# Patient Record
Sex: FEMALE | Race: WHITE | NOT HISPANIC OR LATINO | Employment: FULL TIME | URBAN - METROPOLITAN AREA
[De-identification: names, ages, dates, MRNs, and addresses within clinical notes are randomized per-mention and may not be internally consistent; named-entity substitution may affect disease eponyms.]

---

## 2023-06-30 LAB
ERYTHROCYTE DISTRIBUTION WIDTH (RATIO) BY AUTOMATED COUNT: 13.3 % (ref 11.5–14.5)
ERYTHROCYTE MEAN CORPUSCULAR HEMOGLOBIN CONCENTRATION (G/DL) BY AUTOMATED: 32.9 G/DL (ref 32–36)
ERYTHROCYTE MEAN CORPUSCULAR VOLUME (FL) BY AUTOMATED COUNT: 89 FL (ref 80–100)
ERYTHROCYTES (10*6/UL) IN BLOOD BY AUTOMATED COUNT: 4.62 X10E12/L (ref 4–5.2)
HEMATOCRIT (%) IN BLOOD BY AUTOMATED COUNT: 41.3 % (ref 36–46)
HEMOGLOBIN (G/DL) IN BLOOD: 13.6 G/DL (ref 12–16)
LEUKOCYTES (10*3/UL) IN BLOOD BY AUTOMATED COUNT: 6.2 X10E9/L (ref 4.4–11.3)
PLATELETS (10*3/UL) IN BLOOD AUTOMATED COUNT: 328 X10E9/L (ref 150–450)
THYROTROPIN (MIU/L) IN SER/PLAS BY DETECTION LIMIT <= 0.05 MIU/L: 1.58 MIU/L (ref 0.44–3.98)
THYROXINE (T4) (UG/DL) IN SER/PLAS: 8.5 UG/DL (ref 4.5–11.1)
TRIIODOTHYRONINE (T3) (NG/DL) IN SER/PLAS: 104 NG/DL (ref 60–200)

## 2023-07-19 ENCOUNTER — HOSPITAL ENCOUNTER (OUTPATIENT)
Dept: DATA CONVERSION | Facility: HOSPITAL | Age: 68
End: 2023-07-19
Attending: RADIOLOGY
Payer: COMMERCIAL

## 2023-07-19 DIAGNOSIS — E04.1 NONTOXIC SINGLE THYROID NODULE: ICD-10-CM

## 2023-07-21 LAB
COMPLETE PATHOLOGY REPORT: NORMAL
CONVERTED CLINICAL DIAGNOSIS-HISTORY: NORMAL
CONVERTED DIAGNOSIS COMMENT: NORMAL
CONVERTED FINAL DIAGNOSIS: NORMAL
CONVERTED FINAL REPORT PDF LINK TO COPY AND PASTE: NORMAL
CONVERTED SPECIMEN DESCRIPTION: NORMAL

## 2024-01-10 ENCOUNTER — APPOINTMENT (OUTPATIENT)
Dept: PRIMARY CARE | Facility: CLINIC | Age: 69
End: 2024-01-10
Payer: COMMERCIAL

## 2024-01-15 ENCOUNTER — OFFICE VISIT (OUTPATIENT)
Dept: PRIMARY CARE | Facility: CLINIC | Age: 69
End: 2024-01-15
Payer: COMMERCIAL

## 2024-01-15 VITALS
DIASTOLIC BLOOD PRESSURE: 60 MMHG | HEIGHT: 63 IN | OXYGEN SATURATION: 96 % | TEMPERATURE: 98 F | WEIGHT: 141 LBS | SYSTOLIC BLOOD PRESSURE: 128 MMHG | BODY MASS INDEX: 24.98 KG/M2 | HEART RATE: 69 BPM

## 2024-01-15 DIAGNOSIS — M17.11 PRIMARY OSTEOARTHRITIS OF RIGHT KNEE: ICD-10-CM

## 2024-01-15 DIAGNOSIS — L84 CORN OF FOOT: Primary | ICD-10-CM

## 2024-01-15 PROCEDURE — 99214 OFFICE O/P EST MOD 30 MIN: CPT | Performed by: FAMILY MEDICINE

## 2024-01-15 PROCEDURE — 1159F MED LIST DOCD IN RCRD: CPT | Performed by: FAMILY MEDICINE

## 2024-01-15 PROCEDURE — 1036F TOBACCO NON-USER: CPT | Performed by: FAMILY MEDICINE

## 2024-01-15 RX ORDER — ALENDRONATE SODIUM 70 MG/1
TABLET ORAL
COMMUNITY
Start: 2018-10-04

## 2024-01-15 ASSESSMENT — ENCOUNTER SYMPTOMS
LOSS OF SENSATION IN FEET: 0
DEPRESSION: 0
OCCASIONAL FEELINGS OF UNSTEADINESS: 0

## 2024-01-15 NOTE — PROGRESS NOTES
"Subjective   Patient ID: Yelena Padilla is a 68 y.o. female who presents for Knee Pain.    HPI C/o intermittent achy pain right knee x 3 months. Denies any known injury.     Also c/o growth right big toe x 1 month.     Review of Systems   All other systems reviewed and are negative.      Objective   /60   Pulse 69   Temp 36.7 °C (98 °F)   Ht 1.588 m (5' 2.5\")   Wt 64 kg (141 lb)   SpO2 96%   BMI 25.38 kg/m²     Physical Exam  Constitutional:       Appearance: Normal appearance.   HENT:      Mouth/Throat:      Mouth: Mucous membranes are moist.   Eyes:      Conjunctiva/sclera: Conjunctivae normal.   Cardiovascular:      Rate and Rhythm: Normal rate and regular rhythm.   Pulmonary:      Effort: Pulmonary effort is normal.      Breath sounds: Normal breath sounds.   Abdominal:      Palpations: Abdomen is soft.   Musculoskeletal:         General: Normal range of motion.      Comments: Right knee was not red, hot or showing effusion. She has normal ROM. Gait was wnl.   Skin:     General: Skin is warm and dry.      Comments: Pt has a very small 1 mm superficial corn in the plantar surface of her right big toe. This was easily removed manually in the office.   Neurological:      Mental Status: She is alert and oriented to person, place, and time.   Psychiatric:         Mood and Affect: Mood normal.         Assessment/Plan Encouraged to use pumice stone and ceramide lotion for corn. 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 She is refusing Xray or PT. Demonstrated several at home knee exercises and referred to \"knees over toes elaine\" for online instruction on home strengthening exercises for the knee -- states she is willing to do that. Will RTC if the pain worsens. Also may use ice and OTC NSAID.          "

## 2024-04-30 ENCOUNTER — HOSPITAL ENCOUNTER (OUTPATIENT)
Dept: RADIOLOGY | Facility: EXTERNAL LOCATION | Age: 69
Discharge: HOME | End: 2024-04-30

## 2024-04-30 ENCOUNTER — OFFICE VISIT (OUTPATIENT)
Dept: PRIMARY CARE | Facility: CLINIC | Age: 69
End: 2024-04-30
Payer: COMMERCIAL

## 2024-04-30 VITALS
BODY MASS INDEX: 23.01 KG/M2 | SYSTOLIC BLOOD PRESSURE: 142 MMHG | HEIGHT: 64 IN | DIASTOLIC BLOOD PRESSURE: 62 MMHG | WEIGHT: 134.8 LBS | HEART RATE: 87 BPM | OXYGEN SATURATION: 98 %

## 2024-04-30 DIAGNOSIS — R07.81 RIB PAIN ON LEFT SIDE: Primary | ICD-10-CM

## 2024-04-30 DIAGNOSIS — R07.81 RIB PAIN ON LEFT SIDE: ICD-10-CM

## 2024-04-30 PROCEDURE — 1159F MED LIST DOCD IN RCRD: CPT | Performed by: PHYSICIAN ASSISTANT

## 2024-04-30 PROCEDURE — 99214 OFFICE O/P EST MOD 30 MIN: CPT | Performed by: PHYSICIAN ASSISTANT

## 2024-04-30 ASSESSMENT — PATIENT HEALTH QUESTIONNAIRE - PHQ9
SUM OF ALL RESPONSES TO PHQ9 QUESTIONS 1 AND 2: 0
1. LITTLE INTEREST OR PLEASURE IN DOING THINGS: NOT AT ALL
2. FEELING DOWN, DEPRESSED OR HOPELESS: NOT AT ALL

## 2024-04-30 NOTE — PROGRESS NOTES
"Subjective   Patient ID: Yelena Padilla is a 69 y.o. female who presents for left side rib pain.    HPI     Two days ago, she was reaching down off of her mower and felt and heard a \"crack\" in left side when she was leaning far against the tractor. She had immediate pain when it happened - was in a small area where she felt the crack. Did feel slight pain with walking. Yesterday, she did have a little pain but 'nothing that she couldn't handle.' This morning noticed that it was sharper/worse. Her bra pressing on the area was even causing pain. Twisting to the right, lifting with her arms, and also leaning back causes increased pain. She has not felt any pop/crack since then. Pain is located under the left breast. She is having pain currently just sitting, rated 6.5/10 and is 9/10 with flaring up with movement - \"takes her breath away.\" There is some pain with deep breathing, but not with coughing/sneezing.     Review of Systems   All other systems reviewed and are negative.      Objective   /62 (BP Location: Right arm, Patient Position: Sitting, BP Cuff Size: Adult)   Pulse 87   Ht 1.626 m (5' 4\")   Wt 61.1 kg (134 lb 12.8 oz)   SpO2 98%   BMI 23.14 kg/m²     Physical Exam  Vitals reviewed.   Constitutional:       General: She is awake.      Appearance: Normal appearance. She is well-developed.   HENT:      Head: Normocephalic and atraumatic.   Cardiovascular:      Rate and Rhythm: Normal rate.   Pulmonary:      Effort: Pulmonary effort is normal.   Chest:      Chest wall: Tenderness present.          Comments: TTP over the circled area, and also there is a firm \"lump\" that does not appear consistent on the right side, right in the pt's area of pain and over the rib bone directly. No obvious deformity or defect noted. No bruising.  Musculoskeletal:      Cervical back: Full passive range of motion without pain.      Right lower leg: No edema.      Left lower leg: No edema.   Skin:     General: Skin is warm " and dry.      Findings: No lesion or rash.   Neurological:      General: No focal deficit present.      Mental Status: She is alert and oriented to person, place, and time.      Cranial Nerves: No facial asymmetry.      Motor: Motor function is intact.      Gait: Gait is intact.   Psychiatric:         Attention and Perception: Attention normal.         Mood and Affect: Mood and affect normal.         Assessment/Plan   Diagnoses and all orders for this visit:  Rib pain on left side  -     XR ribs 2 views left w chest pa or ap; Future    Pt declines anything to use for pain control at this time. Would just like imaging order for XR to check for broken rib. Do not suspect that there is a punctured lung, but cannot definitively rule out broken rib with some displacement without imaging. Discussed with pt that without displacement, sometimes rib fractures that are somewhat new can be difficult to see on XR and may require repeat imaging; however, treatment is conservative for these fractures, and if pt's pain is improving with rest and OTC medications as needed, she may not necessarily need the follow up imaging. Pt verbalized understanding of this.

## 2024-05-01 ENCOUNTER — TELEPHONE (OUTPATIENT)
Dept: PRIMARY CARE | Facility: CLINIC | Age: 69
End: 2024-05-01
Payer: COMMERCIAL

## 2024-05-07 ENCOUNTER — OFFICE VISIT (OUTPATIENT)
Dept: PRIMARY CARE | Facility: CLINIC | Age: 69
End: 2024-05-07
Payer: COMMERCIAL

## 2024-05-07 VITALS
WEIGHT: 136 LBS | SYSTOLIC BLOOD PRESSURE: 118 MMHG | HEART RATE: 75 BPM | DIASTOLIC BLOOD PRESSURE: 64 MMHG | TEMPERATURE: 97.8 F | HEIGHT: 64 IN | OXYGEN SATURATION: 99 % | BODY MASS INDEX: 23.22 KG/M2

## 2024-05-07 DIAGNOSIS — R13.10 DYSPHAGIA, UNSPECIFIED TYPE: ICD-10-CM

## 2024-05-07 DIAGNOSIS — E04.1 THYROID CYST: Primary | ICD-10-CM

## 2024-05-07 PROCEDURE — 1159F MED LIST DOCD IN RCRD: CPT | Performed by: FAMILY MEDICINE

## 2024-05-07 PROCEDURE — 99214 OFFICE O/P EST MOD 30 MIN: CPT | Performed by: FAMILY MEDICINE

## 2024-05-07 NOTE — PROGRESS NOTES
"Subjective   Patient ID: Yelena Padilla is a 68 y.o. female who presents for Follow-up (Thyroid cyst ).    HPI Patient had Thyroid US done 7/2023. States its sometimes hard to swallow. Patient would like to discuss referral to have cyst removed.     Review of Systems   All other systems reviewed and are negative.      Objective   /64   Pulse 75   Temp 36.6 °C (97.8 °F)   Ht 1.626 m (5' 4\")   Wt 61.7 kg (136 lb)   SpO2 99%   BMI 23.34 kg/m²     Physical Exam  Constitutional:       Appearance: Normal appearance.   HENT:      Mouth/Throat:      Mouth: Mucous membranes are moist.   Eyes:      Conjunctiva/sclera: Conjunctivae normal.   Cardiovascular:      Rate and Rhythm: Normal rate and regular rhythm.   Pulmonary:      Effort: Pulmonary effort is normal.      Breath sounds: Normal breath sounds.   Abdominal:      Palpations: Abdomen is soft.   Musculoskeletal:         General: Normal range of motion.   Skin:     General: Skin is warm and dry.   Neurological:      Mental Status: She is alert and oriented to person, place, and time.   Psychiatric:         Mood and Affect: Mood normal.         Assessment/Plan   Diagnoses and all orders for this visit:  Thyroid cyst  -     Referral to ENT; Future  Dysphagia, unspecified type  -     Referral to ENT; Future       "

## 2024-07-11 PROBLEM — E07.9 MASS OF THYROID GLAND: Status: ACTIVE | Noted: 2023-07-03

## 2024-07-11 PROBLEM — K59.09 OTHER CONSTIPATION: Status: ACTIVE | Noted: 2024-07-11

## 2024-07-11 PROBLEM — R60.9 EDEMA: Status: ACTIVE | Noted: 2024-07-11

## 2024-07-11 PROBLEM — M17.11 OSTEOARTHRITIS OF RIGHT KNEE: Status: ACTIVE | Noted: 2024-07-11

## 2024-07-11 PROBLEM — N32.81 OAB (OVERACTIVE BLADDER): Status: ACTIVE | Noted: 2017-12-08

## 2024-07-11 PROBLEM — H81.11 BENIGN PAROXYSMAL POSITIONAL VERTIGO OF RIGHT EAR: Status: ACTIVE | Noted: 2024-07-11

## 2024-07-11 PROBLEM — E78.5 DYSLIPIDEMIA: Status: ACTIVE | Noted: 2024-07-11

## 2024-07-11 PROBLEM — R05.9 COUGH: Status: ACTIVE | Noted: 2024-07-11

## 2024-07-11 PROBLEM — S30.861A TICK BITE OF ABDOMINAL WALL: Status: ACTIVE | Noted: 2024-07-11

## 2024-07-11 PROBLEM — R10.2 PELVIC PAIN IN FEMALE: Status: ACTIVE | Noted: 2024-07-11

## 2024-07-11 PROBLEM — R07.89 PAIN OF STERNUM: Status: ACTIVE | Noted: 2024-07-11

## 2024-07-11 PROBLEM — T14.8XXA MUSCLE STRAIN: Status: ACTIVE | Noted: 2024-07-11

## 2024-07-11 PROBLEM — H60.90 OTITIS EXTERNA: Status: ACTIVE | Noted: 2024-07-11

## 2024-07-11 PROBLEM — Z20.822 CONTACT WITH AND (SUSPECTED) EXPOSURE TO COVID-19: Status: ACTIVE | Noted: 2022-11-10

## 2024-07-11 PROBLEM — R19.5 POSITIVE COLORECTAL CANCER SCREENING USING COLOGUARD TEST: Status: ACTIVE | Noted: 2024-07-11

## 2024-07-11 PROBLEM — L84 CORN: Status: ACTIVE | Noted: 2024-07-11

## 2024-07-11 PROBLEM — R10.9 ABDOMINAL PAIN: Status: ACTIVE | Noted: 2024-07-11

## 2024-07-11 PROBLEM — R25.1 TREMOR: Status: ACTIVE | Noted: 2024-07-11

## 2024-07-11 PROBLEM — I87.2 VENOUS INSUFFICIENCY: Status: ACTIVE | Noted: 2024-07-11

## 2024-07-11 PROBLEM — R41.3 AMNESIA MEMORY LOSS: Status: ACTIVE | Noted: 2024-07-11
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
## 2024-07-11 PROBLEM — R60.0 BILATERAL LOWER EXTREMITY EDEMA: Status: ACTIVE | Noted: 2024-07-11

## 2024-07-24 ENCOUNTER — APPOINTMENT (OUTPATIENT)
Dept: OTOLARYNGOLOGY | Facility: CLINIC | Age: 69
End: 2024-07-24
Payer: COMMERCIAL

## 2024-07-24 VITALS — HEIGHT: 64 IN | WEIGHT: 132 LBS | BODY MASS INDEX: 22.53 KG/M2

## 2024-07-24 DIAGNOSIS — E04.1 THYROID CYST: Primary | ICD-10-CM

## 2024-07-24 DIAGNOSIS — E04.1 THYROID NODULE: ICD-10-CM

## 2024-07-24 DIAGNOSIS — R13.10 DYSPHAGIA, UNSPECIFIED TYPE: ICD-10-CM

## 2024-07-24 PROCEDURE — 1159F MED LIST DOCD IN RCRD: CPT | Performed by: STUDENT IN AN ORGANIZED HEALTH CARE EDUCATION/TRAINING PROGRAM

## 2024-07-24 PROCEDURE — 99204 OFFICE O/P NEW MOD 45 MIN: CPT | Performed by: STUDENT IN AN ORGANIZED HEALTH CARE EDUCATION/TRAINING PROGRAM

## 2024-07-24 PROCEDURE — 3008F BODY MASS INDEX DOCD: CPT | Performed by: STUDENT IN AN ORGANIZED HEALTH CARE EDUCATION/TRAINING PROGRAM

## 2024-07-24 PROCEDURE — 1036F TOBACCO NON-USER: CPT | Performed by: STUDENT IN AN ORGANIZED HEALTH CARE EDUCATION/TRAINING PROGRAM

## 2024-07-24 PROCEDURE — 31575 DIAGNOSTIC LARYNGOSCOPY: CPT | Performed by: STUDENT IN AN ORGANIZED HEALTH CARE EDUCATION/TRAINING PROGRAM

## 2024-07-24 NOTE — PROGRESS NOTES
BERENICE Padilla is a 69 y.o. female presenting for evaluation of thyroid nodules/ cyst.  The patient underwent a thyroid US 7/4/24 showing a right thyroid nodule measuring up to 10.5 mm TIRADS 5. In addition to prominent cyst along the right thyroid lobe was observed measuring 27mm.  Subsequent FNA was consistent with a benign follicular nodule. TSH 6/3/23 1.58.  Currently the patient endorses occasional pressure along the right thyroid / right neck region more noticeable when swallowing large food bolus, she is interested in exploring surgical options. Denies odynophagia, fevers, chills, night sweats, neck lumps or bumps, neck pain, weight loss, hyper or hypothyroid symptomatology.  Mother with hx of hypothyroidism   No radiation exposure       History reviewed. No pertinent past medical history.    Past Surgical History:   Procedure Laterality Date    OTHER SURGICAL HISTORY  09/27/2022    Tonsillectomy    OTHER SURGICAL HISTORY  09/27/2022    Wrist surgery    US GUIDED THYROID BIOPSY  7/19/2023    US GUIDED THYROID BIOPSY 7/19/2023 POR US         Current Outpatient Medications on File Prior to Visit   Medication Sig Dispense Refill    alendronate (Fosamax) 70 mg tablet TAKE 1 TABLET ONCE A WEEK Orally q week for 84 days       No current facility-administered medications on file prior to visit.        Allergies   Allergen Reactions    Amoxicillin Hives and Unknown    Clarithromycin Hives, Itching, Other, Rash, GI Upset and Unknown    Codeine Other        Review of Systems  A detailed 12 point ROS was performed and is negative except as noted in the intake form, HPI and/or Past Medical History        Physical Exam   CONSTITUTIONAL: Well developed, well nourished.  VOICE: Normal voice quality  RESPIRATION: Breathing comfortably, no stridor.  CV: No clubbing/cyanosis/edema in hands.  EYES: EOM Intact, sclera normal.  NEURO: Alert and oriented times 3, Cranial nerves V,VII intact and symmetric bilaterally.  HEAD AND  FACE: Symmetric facial features, no masses or lesions, sinuses nontender to palpation.  SALIVARY GLANDS: Parotid and submandibular glands normal bilaterally.  EARS: Normal external ears, external auditory canals, and TMs to otoscopy  NOSE: External nose midline, anterior rhinoscopy is normal with limited visualization to the anterior aspect of the interior turbinates. No lesions noted.  ORAL CAVITY/OROPHARYNX/LIPS: Normal mucous membranes, normal floor of mouth/tongue/OP, no masses or lesions are noted.  PHARYNGEAL WALLS AND NASOPHARYNX: No masses noted. Mucosa appears clean and moist  + NECK/LYMPH: No LAD, cyst noted along the right thyroid. Trachea palpably midline  SKIN: Neck skin is without injury  PSYCH: Alert and oriented with appropriate mood and affect     PROCEDURE NOTE:  FLEXIBLE NASOLARYNGOSCOPY     The risks, benefits, and alternatives were explained.  The patient wished to proceed and provided verbal consent.       INDICATIONS: To visualize anatomy in specific detail; evaluation of symptomatic disorder involving the voice, swallowing, or upper aerodigestive tract, including CHENG.      DESCRIPTION OF PROCEDURE: After adequate afrin and lidocaine spray, I advanced the endoscope into the nasal cavity.  The nasal cavity, nasopharynx, tongue base, vallecula, posterior/lateral pharyngeal walls, pyriform, epiglottis, post cricoid area, and larynx were within normal limits.  The following specific findings should be noted:  No lesions/masses  Mobile TVCs bilaterally  No pooling of secretions     The patient tolerated the procedure without difficulty.     Results:  Thyroid US 7/4/23  RIGHT LOBE:  The right thyroid lobe measures 4.6 x 3.3 x 2.9 cm. The right lobe of  the thyroid is heterogenous in echotexture and demonstrates nodules  described below.     A unilocular thin walled cyst in the right thyroid lobe measuring up  to 27 mm on image 18 containing internal debris likely representing a  colloid cyst.      Within the mid right thyroid lobe there is a 10.5 x 10 x 9 mm, solid  or almost completely solid (2 pts), hypoechoic (2 pts),  taller-than-wide (3 pts) nodules with ill-defined (0 pts) margins and  contains no echogenic foci or large comet-tail artifacts (0 pts).  (Image 21)     The total score of this nodule is 7 corresponding to a TI-RADS  category  5; (>7 points) Highly suspicious. FNA is recommended if  >1.0cm, Follow up if >0.5cm every year for 5 years.     LEFT LOBE:  The left lobe of the thyroid measures 3.2 x 1.3 x 1.6 cm. The left  lobe of the thyroid is heterogenous in echotexture and demonstrates  nodules described below.  A 7 mm upper pole left thyroid lobe nodule on image 40.  A 8 mm spongiform nodule in the mid left thyroid lobe on image 43.  A cystic nodule in the lower left thyroid lobe likely representing  colloid cyst on image 46.     ISTHMUS:  The isthmus measures approximately 1 mm and is homogeneous in  echotexture and without any identifiable nodules.     CERVICAL LYMPH NODES:  No cervical lymphadenopathy is present.      Impression   1. Nodule in the  right thyroid gland measuring up to 10.5 mm, a  TIRADS category 5 nodule. Based on the size criteria  fine-needle  aspiration is recommended.  2. Prominent colloid cyst in the right thyroid lobe.  3. Subcentimeter Spongiform nodules and colloid cyst in the left  thyroid lobe for which no additional follow-up is suggested.       Assessment  Right thyroid nodules/ cyst    Plan  69-year-old female presenting for evaluation of thyroid cyst/nodules.  Ultrasound notable for Right thyroid TIRADS category 5 nodule, FNA pathology consistent with benign follicular nodule.  In addition a right colloid cyst was noted.  The patient endorses occasional compressive symptoms and is interested in exploring surgical options vs aspiration.  She was referred to [facility] for further evaluation.

## 2024-08-02 ENCOUNTER — TELEPHONE (OUTPATIENT)
Dept: PRIMARY CARE | Facility: CLINIC | Age: 69
End: 2024-08-02
Payer: COMMERCIAL

## 2024-08-02 NOTE — TELEPHONE ENCOUNTER
Attempted to contact patient to inform mammogram screening due provide order and contact to schedule. Unable to leave voicemail at this time phone rang and gave busy signal.

## 2024-08-15 ENCOUNTER — OFFICE VISIT (OUTPATIENT)
Dept: PRIMARY CARE | Facility: CLINIC | Age: 69
End: 2024-08-15
Payer: COMMERCIAL

## 2024-08-15 VITALS
OXYGEN SATURATION: 97 % | BODY MASS INDEX: 22.94 KG/M2 | HEIGHT: 64 IN | DIASTOLIC BLOOD PRESSURE: 60 MMHG | TEMPERATURE: 97.9 F | SYSTOLIC BLOOD PRESSURE: 118 MMHG | HEART RATE: 65 BPM | WEIGHT: 134.4 LBS

## 2024-08-15 DIAGNOSIS — K59.09 OTHER CONSTIPATION: Primary | ICD-10-CM

## 2024-08-15 PROCEDURE — 1124F ACP DISCUSS-NO DSCNMKR DOCD: CPT | Performed by: FAMILY MEDICINE

## 2024-08-15 PROCEDURE — 3008F BODY MASS INDEX DOCD: CPT | Performed by: FAMILY MEDICINE

## 2024-08-15 PROCEDURE — 1036F TOBACCO NON-USER: CPT | Performed by: FAMILY MEDICINE

## 2024-08-15 PROCEDURE — 1159F MED LIST DOCD IN RCRD: CPT | Performed by: FAMILY MEDICINE

## 2024-08-15 PROCEDURE — 99213 OFFICE O/P EST LOW 20 MIN: CPT | Performed by: FAMILY MEDICINE

## 2024-08-15 ASSESSMENT — PATIENT HEALTH QUESTIONNAIRE - PHQ9
1. LITTLE INTEREST OR PLEASURE IN DOING THINGS: NOT AT ALL
SUM OF ALL RESPONSES TO PHQ9 QUESTIONS 1 AND 2: 0
2. FEELING DOWN, DEPRESSED OR HOPELESS: NOT AT ALL

## 2024-08-15 NOTE — PROGRESS NOTES
"Subjective   Patient ID: Yelena Padilla is a 69 y.o. female who presents for Abdominal Pain.    HPI States over the past week she's had two episodes of clamping sensations across her lower abdomen. States the first one lasted for about 30 minutes and second one lasted a few minutes. Denies diarrhea, denies constipation. States she did have two deep green stools. Denies blood in stool. Denies any change in diet. Patient did have Colonoscopy August of 2022.    Review of Systems   All other systems reviewed and are negative.      Objective   /60   Pulse 65   Temp 36.6 °C (97.9 °F)   Ht 1.626 m (5' 4\")   Wt 61 kg (134 lb 6.4 oz)   SpO2 97%   BMI 23.07 kg/m²     Physical Exam  Constitutional:       Appearance: Normal appearance.   HENT:      Mouth/Throat:      Mouth: Mucous membranes are moist.   Eyes:      Conjunctiva/sclera: Conjunctivae normal.   Cardiovascular:      Rate and Rhythm: Normal rate and regular rhythm.   Pulmonary:      Effort: Pulmonary effort is normal.      Breath sounds: Normal breath sounds.   Abdominal:      Palpations: Abdomen is soft.   Musculoskeletal:         General: Normal range of motion.   Skin:     General: Skin is warm and dry.   Neurological:      Mental Status: She is alert and oriented to person, place, and time.   Psychiatric:         Mood and Affect: Mood normal.         Assessment/Plan   Diagnoses and all orders for this visit:  Other constipation        - This is not a daily issue. Only has a few episodes here and there. No mass protruding in the abdomen. No associated nausea/emesis, no blood in the stool        - Increase hydration and fiber        - RTC with any sign of blood in the stool, regular pain, protruding mass of the abdomen     "

## 2024-08-21 ENCOUNTER — TELEPHONE (OUTPATIENT)
Dept: PRIMARY CARE | Facility: CLINIC | Age: 69
End: 2024-08-21
Payer: COMMERCIAL

## 2024-08-21 DIAGNOSIS — K59.09 OTHER CONSTIPATION: ICD-10-CM

## 2024-08-21 NOTE — TELEPHONE ENCOUNTER
Patient called and left voicemail message stating she doesn't that she would like to have adominal imaging done.   I spoke with Dr. Simms, order placed for Abdominal films. Patient can go into [facility] to have them done.   I tried contacting patients phone number with busy constant busy signal. I also left message on work phone.

## 2024-08-30 ENCOUNTER — HOSPITAL ENCOUNTER (OUTPATIENT)
Dept: RADIOLOGY | Facility: CLINIC | Age: 69
Discharge: HOME | End: 2024-08-30
Payer: COMMERCIAL

## 2024-08-30 DIAGNOSIS — K59.09 OTHER CONSTIPATION: ICD-10-CM

## 2024-08-30 PROCEDURE — 74019 RADEX ABDOMEN 2 VIEWS: CPT

## 2024-10-16 ENCOUNTER — APPOINTMENT (OUTPATIENT)
Dept: OTOLARYNGOLOGY | Facility: CLINIC | Age: 69
End: 2024-10-16
Payer: COMMERCIAL

## 2024-11-25 ENCOUNTER — APPOINTMENT (OUTPATIENT)
Dept: OTOLARYNGOLOGY | Facility: CLINIC | Age: 69
End: 2024-11-25
Payer: COMMERCIAL

## 2024-11-25 VITALS — BODY MASS INDEX: 24.21 KG/M2 | HEIGHT: 64 IN | WEIGHT: 141.8 LBS

## 2024-11-25 DIAGNOSIS — E04.1 THYROID NODULE: ICD-10-CM

## 2024-11-25 PROCEDURE — 1036F TOBACCO NON-USER: CPT | Performed by: OTOLARYNGOLOGY

## 2024-11-25 PROCEDURE — 1159F MED LIST DOCD IN RCRD: CPT | Performed by: OTOLARYNGOLOGY

## 2024-11-25 PROCEDURE — 1160F RVW MEDS BY RX/DR IN RCRD: CPT | Performed by: OTOLARYNGOLOGY

## 2024-11-25 PROCEDURE — 3008F BODY MASS INDEX DOCD: CPT | Performed by: OTOLARYNGOLOGY

## 2024-11-25 PROCEDURE — 99203 OFFICE O/P NEW LOW 30 MIN: CPT | Performed by: OTOLARYNGOLOGY

## 2024-11-25 ASSESSMENT — ENCOUNTER SYMPTOMS
NAUSEA: 0
VOICE CHANGE: 0
FACIAL SWELLING: 0
ADENOPATHY: 0
SHORTNESS OF BREATH: 0
FEVER: 0
FATIGUE: 0
NECK PAIN: 0
VOMITING: 0
SORE THROAT: 0
STRIDOR: 0
CHILLS: 0
UNEXPECTED WEIGHT CHANGE: 0
COUGH: 0
APPETITE CHANGE: 0
TROUBLE SWALLOWING: 0

## 2024-11-25 NOTE — ASSESSMENT & PLAN NOTE
Bilateral thyroid nodules  Right thyroid nodule 1cm TR5 - s/p FNA benign nodule  Right thyroid cyst - s/p drainage   Left subcentimeter nodules - benign appearing  Follow up with repeat thyroid US - ordered today, will call with results

## 2024-11-25 NOTE — PATIENT INSTRUCTIONS
Dear Yelena SILVA Weekly    Welcome to Dr[facility],    Dr. Dalton is a Head and neck oncologist and reconstructive surgeon. This means that she specializes in caring for patients with complex head and neck problems such as cancer, however,you may be seeing her for another reason.      Dr. Dalton's office number is [phone] option #2.  While you may see her at a satellite office, she has a team committed to help meet your healthcare needs at [facility].  This number listed, is the most direct way to communicate with her office.      Dr. Dalton's  answers the office phone from 8am-4pm Monday-Friday.  She can help you with many general questions and information.  Questions that she may not be able to answer will be directed to the appropriate staff.  You will need to leave a message and someone from the team will call you back.     Sherry and Sparkle are Dr. Dalton's primary nurses. They can both be reached by calling the office as well. Sherry is in clinic on Mondays and Wednesdays with Dr. Dalton. Sherry is out of the office on Tuesday's and Friday's, but Sparkle is in the office on Tuesdays and Fridays and will be covering all patient concerns. Please be mindful that all non urgent calls will be returned within 24 hours of the call.     Sometimes, other team members will also be involved in your care.  These people may include dieticians, social workers, speech therapists, medical oncologists, or radiation oncologists.  Dr. Dalton will provide these referrals for you as needed.      For your connivence, Dr. Dalton sees patients at several [facility] locations. These locations are [facility], [facility], and [facility] the [facility].  While we try to make your appointment as convenient as possible, occasionally a visit to another location may be necessary to provide you with the best care.      We look  forward to working with you to meet your healthcare goals.    Dr. Dalton evaluated you today.    Your care plan is outlined below:  -- Thyroid US recommended.   -- We will call you with the thyroid US results.     General appointment line please call [phone]  For general questions or scheduling issues please call [phone] option #2   For medical questions or surgery scheduling please call [phone] on Mondays, Wednesdays and Thursdays or [phone] on Tuesdays and Fridays. Please be sure to leave a voice mail or your call will not be able to be returned.     Dr. Dalton makes every effort to run on time for your appointments.  Therefore, if you are more than 30 minutes late for your appointment, unrelated to a scan or another appointment such as chemotherapy or radiation, your appointment will need to be rescheduled to another day.  We appreciate your understanding.

## 2024-11-25 NOTE — PROGRESS NOTES
Chief Complaint   Patient presents with    thyroid cyst      HPI  Yelena Padilla is a 69 y.o. female referred to me today by Matthew Londono MD for further evaluation and treatment of thyroid nodules. Patient seen by Dr. Neal for thyroid nodules this summer. She had a thyroid US 7/3/23 which I personally reviewed. The right thyroid lobe measures 4.6 x 3.3 x 2.9 cm. Thin walled cyst in the right thyroid lobe measuring 2.7cm. In the mid right thyroid lobe there is a 1.05 x 1.0 x 0.9 cm. TIRADS 5. The left lobe of the thyroid measures 3.2 x 1.3 x 1.6 cm.  She had small subcentimeter left nodules which were all benign appearing, none of which met criteria for FNA.  Patient had an FNA of her R thyroid nodule TR5, and right thyroid cyst.  I personally reviewed her thyroid US and FNA results.  FNA of thyroid nodule was benign and cyst fluid was fluid.  She has done well.  Feels like there is more room in her throat since cyst was drained.  No imaging in the last year.  She is here for further evaluation.  No personal history of radiation.  No FH of thyroid cancer (mom with hypothyroidism).  No voice changes.  Denies odynophagia, dysphagia or otalgia. Tolerating a regular diet.  Denies weight loss.  No fevers/chills.  No night sweats.     Social History  She reports that she has never smoked. She has never used smokeless tobacco. She reports current alcohol use. She reports current drug use. Drug: Marijuana.    PMH  She has no past medical history on file.     PSH  She has a past surgical history that includes Other surgical history (09/27/2022); Other surgical history (09/27/2022); and US guided thyroid biopsy (7/19/2023).    ROS  Review of Systems   Constitutional:  Negative for appetite change, chills, fatigue, fever and unexpected weight change.   HENT:  Negative for dental problem, drooling, ear pain, facial swelling, hearing loss, mouth sores, sore throat, tinnitus, trouble swallowing and voice change.     Respiratory:  Negative for cough, shortness of breath and stridor.    Gastrointestinal:  Negative for nausea and vomiting.   Musculoskeletal:  Negative for neck pain.   Hematological:  Negative for adenopathy.        PE  ENT Physical Exam  Constitutional  Appearance: patient appears well-developed, thin,  Communication/Voice: communication appropriate for developmental age;  Head and Face  Appearance: head appears normal and face appears normal;  Salivary: glands normal;  Ear  Auricles: right auricle normal; left auricle normal;  Ear Canals: right ear canal normal; left ear canal normal;  Tympanic Membranes: right tympanic membrane normal; left tympanic membrane normal;  Nose  Internal Nose: nasal mucosa normal; septum normal;  Oral Cavity/Oropharynx  Gums: gingiva normal;  Tongue: normal;  Oral mucosa: normal;  Neck  Neck: neck normal; normal trachea;  Thyroid: Thyroid comments: No palpable nodules bilaterally - good elevation with swallowing  Neck comments: No lymphadenopathy  Respiratory  Inspection: breathing unlabored;  Cardiovascular  Inspection: extremities are warm and well perfused;  Neurovestibular  Mental Status: alert and oriented;  Psychiatric: mood normal; affect is appropriate;  Cranial Nerves: cranial nerves intact;       Procedures     ASSESSMENT AND PLAN  Problem List Items Addressed This Visit       Thyroid nodule    Current Assessment & Plan     Bilateral thyroid nodules  Right thyroid nodule 1cm TR5 - s/p FNA benign nodule  Right thyroid cyst - s/p drainage   Left subcentimeter nodules - benign appearing  Follow up with repeat thyroid US - ordered today, will call with results         Relevant Orders    US thyroid        Nyasia Dalton MD    Head & Neck Surgical Oncology & Reconstruction  Department of Otolaryngology - Head and Neck Surgery     By signing my name below, ISherry Scribe, attest that this documentation has been prepared under the direction and in the presence of  Dr. Nyasia Dalton MD.     All medical record entries made by the Scribe were at my direction and personally dictated by me, Dr. Nyasia Dalton. I have reviewed the chart and agree that the record accurately reflects my personal performance of the history, physical exam, discussion and plan.

## 2024-12-10 ENCOUNTER — OFFICE VISIT (OUTPATIENT)
Dept: PRIMARY CARE | Facility: CLINIC | Age: 69
End: 2024-12-10
Payer: MEDICARE

## 2024-12-10 VITALS
BODY MASS INDEX: 24.07 KG/M2 | HEIGHT: 64 IN | TEMPERATURE: 98.1 F | DIASTOLIC BLOOD PRESSURE: 60 MMHG | OXYGEN SATURATION: 96 % | WEIGHT: 141 LBS | HEART RATE: 70 BPM | SYSTOLIC BLOOD PRESSURE: 112 MMHG

## 2024-12-10 DIAGNOSIS — S00.31XA ABRASION OF NOSE, INITIAL ENCOUNTER: ICD-10-CM

## 2024-12-10 DIAGNOSIS — H10.501 BLEPHAROCONJUNCTIVITIS OF RIGHT EYE, UNSPECIFIED BLEPHAROCONJUNCTIVITIS TYPE: Primary | ICD-10-CM

## 2024-12-10 PROCEDURE — 1036F TOBACCO NON-USER: CPT | Performed by: FAMILY MEDICINE

## 2024-12-10 PROCEDURE — 99213 OFFICE O/P EST LOW 20 MIN: CPT | Performed by: FAMILY MEDICINE

## 2024-12-10 PROCEDURE — 1159F MED LIST DOCD IN RCRD: CPT | Performed by: FAMILY MEDICINE

## 2024-12-10 PROCEDURE — 3008F BODY MASS INDEX DOCD: CPT | Performed by: FAMILY MEDICINE

## 2024-12-10 PROCEDURE — 1124F ACP DISCUSS-NO DSCNMKR DOCD: CPT | Performed by: FAMILY MEDICINE

## 2024-12-10 RX ORDER — MUPIROCIN CALCIUM 20 MG/G
CREAM TOPICAL 3 TIMES DAILY
Qty: 30 G | Refills: 0 | Status: SHIPPED | OUTPATIENT
Start: 2024-12-10 | End: 2024-12-24

## 2024-12-10 RX ORDER — TOBRAMYCIN 3 MG/ML
2 SOLUTION/ DROPS OPHTHALMIC EVERY 4 HOURS
Qty: 5 ML | Refills: 0 | Status: SHIPPED | OUTPATIENT
Start: 2024-12-10 | End: 2024-12-15

## 2024-12-10 ASSESSMENT — PATIENT HEALTH QUESTIONNAIRE - PHQ9
1. LITTLE INTEREST OR PLEASURE IN DOING THINGS: NOT AT ALL
2. FEELING DOWN, DEPRESSED OR HOPELESS: NOT AT ALL
SUM OF ALL RESPONSES TO PHQ9 QUESTIONS 1 AND 2: 0

## 2024-12-10 NOTE — PROGRESS NOTES
"Subjective   Patient ID: Yelena Padilla is a 69 y.o. female who presents for Eye Pain.    HPI States her dog scratched her right eye this morning, c/o pain and redness. Patient did rinse her with cold water.    Review of Systems   All other systems reviewed and are negative.      Objective   /60   Pulse 70   Temp 36.7 °C (98.1 °F)   Ht 1.626 m (5' 4\")   Wt 64 kg (141 lb)   SpO2 96%   BMI 24.20 kg/m²     Physical Exam  Constitutional:       Appearance: Normal appearance.   HENT:      Mouth/Throat:      Mouth: Mucous membranes are moist.   Eyes:      Conjunctiva/sclera: Conjunctivae normal.      Comments: Pt has a small scratch wound in the medial canthus of the right eye. She additionally has an abrasion on the right aspect of the nasal bridge consistent with dog paw scratch she is complaining of. No discharge. Minimal conjunctivitis of the medial aspect of the right eye. No crusting. Lids close normally.   Cardiovascular:      Rate and Rhythm: Normal rate and regular rhythm.   Pulmonary:      Effort: Pulmonary effort is normal.      Breath sounds: Normal breath sounds.   Abdominal:      Palpations: Abdomen is soft.   Musculoskeletal:         General: Normal range of motion.   Skin:     General: Skin is warm and dry.   Neurological:      Mental Status: She is alert and oriented to person, place, and time.   Psychiatric:         Mood and Affect: Mood normal.         Assessment/Plan   Diagnoses and all orders for this visit:  Blepharoconjunctivitis of right eye, unspecified blepharoconjunctivitis type  -     tobramycin (Tobrex) 0.3 % ophthalmic solution; Administer 2 drops into the right eye every 4 hours for 5 days.  Abrasion of nose, initial encounter  -     mupirocin (Bactroban) 2 % cream; Apply topically 3 times a day for 14 days. apply to affected area     RTC only if there is no improvement with Tx or if condition   "

## 2025-04-23 ENCOUNTER — OFFICE VISIT (OUTPATIENT)
Dept: PRIMARY CARE | Facility: CLINIC | Age: 70
End: 2025-04-23
Payer: MEDICARE

## 2025-04-23 VITALS
WEIGHT: 142 LBS | HEART RATE: 76 BPM | TEMPERATURE: 97.7 F | HEIGHT: 64 IN | SYSTOLIC BLOOD PRESSURE: 112 MMHG | DIASTOLIC BLOOD PRESSURE: 60 MMHG | OXYGEN SATURATION: 97 % | BODY MASS INDEX: 24.24 KG/M2

## 2025-04-23 DIAGNOSIS — M25.461 EFFUSION, RIGHT KNEE: ICD-10-CM

## 2025-04-23 DIAGNOSIS — M25.561 ACUTE PAIN OF RIGHT KNEE: Primary | ICD-10-CM

## 2025-04-23 PROCEDURE — 1158F ADVNC CARE PLAN TLK DOCD: CPT | Performed by: FAMILY MEDICINE

## 2025-04-23 PROCEDURE — 1036F TOBACCO NON-USER: CPT | Performed by: FAMILY MEDICINE

## 2025-04-23 PROCEDURE — 3008F BODY MASS INDEX DOCD: CPT | Performed by: FAMILY MEDICINE

## 2025-04-23 PROCEDURE — 99214 OFFICE O/P EST MOD 30 MIN: CPT | Performed by: FAMILY MEDICINE

## 2025-04-23 PROCEDURE — 1159F MED LIST DOCD IN RCRD: CPT | Performed by: FAMILY MEDICINE

## 2025-04-23 PROCEDURE — 1123F ACP DISCUSS/DSCN MKR DOCD: CPT | Performed by: FAMILY MEDICINE

## 2025-04-23 ASSESSMENT — PATIENT HEALTH QUESTIONNAIRE - PHQ9
SUM OF ALL RESPONSES TO PHQ9 QUESTIONS 1 AND 2: 0
2. FEELING DOWN, DEPRESSED OR HOPELESS: NOT AT ALL
1. LITTLE INTEREST OR PLEASURE IN DOING THINGS: NOT AT ALL

## 2025-04-23 NOTE — PROGRESS NOTES
"Subjective   Patient ID: Yelena Padilla is a 69 y.o. female who presents for Knee Pain.    HPI Pain, swelling right knee. Pain started months ago but recently worsened. Denies any known injury. Denies any recent imaging. Has tried Tylenol with relief.     Review of Systems   All other systems reviewed and are negative.      Objective   /60   Pulse 76   Temp 36.5 °C (97.7 °F)   Ht 1.626 m (5' 4\")   Wt 64.4 kg (142 lb)   SpO2 97%   BMI 24.37 kg/m²     Physical Exam  Constitutional:       Appearance: Normal appearance.   HENT:      Mouth/Throat:      Mouth: Mucous membranes are moist.   Eyes:      Conjunctiva/sclera: Conjunctivae normal.   Cardiovascular:      Rate and Rhythm: Normal rate and regular rhythm.   Pulmonary:      Effort: Pulmonary effort is normal.      Breath sounds: Normal breath sounds.   Abdominal:      Palpations: Abdomen is soft.   Musculoskeletal:         General: Normal range of motion.      Comments: Mild right knee effusion present as compared to the left knee. There is moderately severe pain with weight bearing. Antalgic gait.   Skin:     General: Skin is warm and dry.   Neurological:      Mental Status: She is alert and oriented to person, place, and time.   Psychiatric:         Mood and Affect: Mood normal.         Assessment/Plan   Diagnoses and all orders for this visit:  Acute pain of right knee  -     MR knee right wo IV contrast; Future  Effusion, right knee  -     MR knee right wo IV contrast; Future     RTC ASAP once completed  "

## 2025-04-24 ENCOUNTER — TELEPHONE (OUTPATIENT)
Dept: PRIMARY CARE | Facility: CLINIC | Age: 70
End: 2025-04-24
Payer: MEDICARE

## 2025-04-24 NOTE — TELEPHONE ENCOUNTER
MRI right knee without contrast faxed to [facility], [phone]. They will contact patient directly to schedule.   Left message for patient to return our phone call to inform.

## 2025-05-28 ENCOUNTER — TELEPHONE (OUTPATIENT)
Dept: PRIMARY CARE | Facility: CLINIC | Age: 70
End: 2025-05-28
Payer: MEDICARE

## 2025-05-29 ENCOUNTER — TELEMEDICINE (OUTPATIENT)
Dept: PRIMARY CARE | Facility: CLINIC | Age: 70
End: 2025-05-29
Payer: MEDICARE

## 2025-05-29 DIAGNOSIS — S83.241D ACUTE MEDIAL MENISCUS TEAR OF RIGHT KNEE, SUBSEQUENT ENCOUNTER: Primary | ICD-10-CM

## 2025-05-29 PROCEDURE — 99214 OFFICE O/P EST MOD 30 MIN: CPT | Performed by: FAMILY MEDICINE

## 2025-05-29 PROCEDURE — 1036F TOBACCO NON-USER: CPT | Performed by: FAMILY MEDICINE

## 2025-05-29 PROCEDURE — 1159F MED LIST DOCD IN RCRD: CPT | Performed by: FAMILY MEDICINE

## 2025-05-29 PROCEDURE — 1124F ACP DISCUSS-NO DSCNMKR DOCD: CPT | Performed by: FAMILY MEDICINE

## 2025-05-29 NOTE — PROGRESS NOTES
Subjective   Patient ID: Yelena Padilla is a 70 y.o. female who presents for Follow-up and Knee Pain.    HPI Continues with knee pain. Is requesting to go over previous MRI results.     Review of Systems   All other systems reviewed and are negative.      Objective   There were no vitals taken for this visit.    Physical Exam  Pulmonary:      Effort: Pulmonary effort is normal.   Neurological:      Mental Status: She is alert and oriented to person, place, and time.   Psychiatric:         Mood and Affect: Mood normal.         Behavior: Behavior normal.         Thought Content: Thought content normal.         Assessment/Plan   Diagnoses and all orders for this visit:  Acute medial meniscus tear of right knee, subsequent encounter  -     Referral to Orthopedics and Sports Medicine; Future  - Pain is under control so no additional pain medication is needed at this time.

## 2025-05-30 ENCOUNTER — TELEPHONE (OUTPATIENT)
Dept: PRIMARY CARE | Facility: CLINIC | Age: 70
End: 2025-05-30
Payer: MEDICARE

## 2025-06-05 ENCOUNTER — APPOINTMENT (OUTPATIENT)
Dept: ORTHOPEDIC SURGERY | Facility: HOSPITAL | Age: 70
End: 2025-06-05
Payer: MEDICARE

## 2025-06-10 ENCOUNTER — OFFICE VISIT (OUTPATIENT)
Dept: ORTHOPEDIC SURGERY | Facility: HOSPITAL | Age: 70
End: 2025-06-10
Payer: MEDICARE

## 2025-06-10 VITALS — WEIGHT: 137 LBS | BODY MASS INDEX: 23.39 KG/M2 | HEIGHT: 64 IN

## 2025-06-10 DIAGNOSIS — S83.241D ACUTE MEDIAL MENISCUS TEAR OF RIGHT KNEE, SUBSEQUENT ENCOUNTER: ICD-10-CM

## 2025-06-10 DIAGNOSIS — M17.11 PRIMARY OSTEOARTHRITIS OF RIGHT KNEE: Primary | ICD-10-CM

## 2025-06-10 PROCEDURE — 1159F MED LIST DOCD IN RCRD: CPT | Performed by: SPECIALIST

## 2025-06-10 PROCEDURE — 3008F BODY MASS INDEX DOCD: CPT | Performed by: SPECIALIST

## 2025-06-10 PROCEDURE — 99212 OFFICE O/P EST SF 10 MIN: CPT | Performed by: SPECIALIST

## 2025-06-10 PROCEDURE — 99204 OFFICE O/P NEW MOD 45 MIN: CPT | Performed by: SPECIALIST

## 2025-06-10 ASSESSMENT — ENCOUNTER SYMPTOMS
LOSS OF SENSATION IN FEET: 0
OCCASIONAL FEELINGS OF UNSTEADINESS: 0
DEPRESSION: 0

## 2025-06-10 NOTE — PROGRESS NOTES
[facility] Referred by Dr. Nicho Simms Right Meniscus Tear   MRI on disc   DOI 5/9/25     Patient was walking when she stepped awkwardly twisting the knee has had ongoing pain since. No previous injuries.    HPI above.  No other constitutional symptoms.  Mechanical symptoms and pain improving.    GENERAL: A/Ox3, NAD. Appears healthy, well nourished  PSYCHIATRIC: Mood stable, appropriate memory recall  SKIN: no erythema, rashes, or ecchymoses     MUSCULOSKELETAL:  Laterality: Right knee Exam  - Alignment: neutral  - ROM:  0 - >130 deg, mild left medial joint line pain with full flexion  - Effusion: none  - Strength: knee extension and flexion 5/5, EHL/PF/DF motor intact  - Palpation: TTP along posteromedial joint line  - Stability: Anterior/Posterior stable, varus/valgus stable  - Gait: normal  - Hip Exam: flexion to 100+ degrees, full extension, internal/external rotation adequate, and no pain with log roll  - Special Tests: positive [place] without palpable click and pain at medial joint line     NEUROVASCULAR:  - Neurovascular Status: sensation intact to light touch distally  - Capillary refill brisk at extremities, Bilateral dorsalis pedis pulse 2+    RADIOGRAPHS: Reviewed MRI of the right knee which shows mild degenerative changes and a degenerative medial meniscus tear and medial compartment osteoarthritis.    ASSESSMENT: Right degenerative medial meniscus tear    PLAN: Patient's symptoms have recently improved and she is counseled on range of motion strengthening program.  Unfortunately with her degree of arthritis is simple outpatient arthroscopic partial medial meniscectomy would probably not improve her result, and may hasten arthritis.  Would treat this currently conservatively.  If pain flares consider injection.  At some point may advance to require knee arthroplasty.  If symptoms persist would have her obtain three-view standing x-rays right knee at future visit.    This note was dictated using speech  recognition software and was not corrected for spelling or grammatical errors

## 2025-06-18 ENCOUNTER — APPOINTMENT (OUTPATIENT)
Dept: PRIMARY CARE | Facility: CLINIC | Age: 70
End: 2025-06-18
Payer: MEDICARE

## 2025-06-18 VITALS
DIASTOLIC BLOOD PRESSURE: 70 MMHG | HEIGHT: 63 IN | OXYGEN SATURATION: 99 % | WEIGHT: 144.6 LBS | TEMPERATURE: 97.8 F | BODY MASS INDEX: 25.62 KG/M2 | HEART RATE: 70 BPM | SYSTOLIC BLOOD PRESSURE: 112 MMHG

## 2025-06-18 DIAGNOSIS — Z13.6 ENCOUNTER FOR LIPID SCREENING FOR CARDIOVASCULAR DISEASE: ICD-10-CM

## 2025-06-18 DIAGNOSIS — Z13.220 ENCOUNTER FOR LIPID SCREENING FOR CARDIOVASCULAR DISEASE: ICD-10-CM

## 2025-06-18 DIAGNOSIS — R53.83 OTHER FATIGUE: ICD-10-CM

## 2025-06-18 DIAGNOSIS — Z00.00 HEALTHCARE MAINTENANCE: ICD-10-CM

## 2025-06-18 DIAGNOSIS — Z00.00 ROUTINE GENERAL MEDICAL EXAMINATION AT A HEALTH CARE FACILITY: Primary | ICD-10-CM

## 2025-06-18 PROCEDURE — G0439 PPPS, SUBSEQ VISIT: HCPCS | Performed by: FAMILY MEDICINE

## 2025-06-18 PROCEDURE — 99397 PER PM REEVAL EST PAT 65+ YR: CPT | Performed by: FAMILY MEDICINE

## 2025-06-18 PROCEDURE — 3008F BODY MASS INDEX DOCD: CPT | Performed by: FAMILY MEDICINE

## 2025-06-18 PROCEDURE — 1036F TOBACCO NON-USER: CPT | Performed by: FAMILY MEDICINE

## 2025-06-18 PROCEDURE — 1160F RVW MEDS BY RX/DR IN RCRD: CPT | Performed by: FAMILY MEDICINE

## 2025-06-18 PROCEDURE — 1159F MED LIST DOCD IN RCRD: CPT | Performed by: FAMILY MEDICINE

## 2025-06-18 PROCEDURE — 1170F FXNL STATUS ASSESSED: CPT | Performed by: FAMILY MEDICINE

## 2025-06-18 ASSESSMENT — VISUAL ACUITY
OD_CC: 20/40
OS_CC: 20/40

## 2025-06-18 ASSESSMENT — ACTIVITIES OF DAILY LIVING (ADL)
DRESSING: INDEPENDENT
MANAGING_FINANCES: INDEPENDENT
DOING_HOUSEWORK: INDEPENDENT
GROCERY_SHOPPING: INDEPENDENT
BATHING: INDEPENDENT
TAKING_MEDICATION: INDEPENDENT

## 2025-06-18 ASSESSMENT — ENCOUNTER SYMPTOMS
DEPRESSION: 0
LOSS OF SENSATION IN FEET: 0
OCCASIONAL FEELINGS OF UNSTEADINESS: 0
FATIGUE: 1

## 2025-06-18 NOTE — PROGRESS NOTES
"Subjective   Reason for Visit: Yelena Padilla is an 70 y.o. female here for a Medicare Wellness visit.          Reviewed all medications by prescribing practitioner or clinical pharmacist (such as prescriptions, OTCs, herbal therapies and supplements) and documented in the medical record.    HPI Is not fasting for labs. Patient's half-sister had breast cancer. Patient's last Mammogram was done 8/2024 ordered by her OB/GYN. Patient's last Colonoscopy was done in 2022. Patients last pap test was done in 2022 with normal results. Patient had Dexa scan done in 8/2022, ordered by her OB/GYN. Takes Fosamax.   Patient isn't sure when her last Tdap vaccine was and declines to get another one.   Patient denies ever getting Shingles, Pneumonia, Shingles vaccines. Declines to get one.     Patient Care Team:  Nicho Simms DO as PCP - General  Nicho Simms DO as PCP - [employer] PCP     Review of Systems   Constitutional:  Positive for fatigue.   All other systems reviewed and are negative.      Objective   Vitals:  /70   Pulse 70   Temp 36.6 °C (97.8 °F)   Ht 1.588 m (5' 2.5\")   Wt 65.6 kg (144 lb 9.6 oz)   SpO2 99%   BMI 26.03 kg/m²       Physical Exam  Constitutional:       Appearance: Normal appearance.   HENT:      Mouth/Throat:      Mouth: Mucous membranes are moist.   Eyes:      Conjunctiva/sclera: Conjunctivae normal.   Cardiovascular:      Rate and Rhythm: Normal rate and regular rhythm.   Pulmonary:      Effort: Pulmonary effort is normal.      Breath sounds: Normal breath sounds.   Abdominal:      Palpations: Abdomen is soft.   Musculoskeletal:         General: Normal range of motion.   Skin:     General: Skin is warm and dry.   Neurological:      Mental Status: She is alert and oriented to person, place, and time.   Psychiatric:         Mood and Affect: Mood normal.         Assessment & Plan  Routine general medical examination at a health care facility  Normal exam  All " medicare questions were asked and illicited no concern with patient.  Repeat well exam 1 year       Healthcare maintenance  Normal exam   No issues identified with ore-exam questions   Repeat well exam 1 year     Orders:    Comprehensive metabolic panel; Future    Encounter for lipid screening for cardiovascular disease    Orders:    Lipid panel; Future    Other fatigue    Orders:    CBC; Future

## 2025-08-19 ENCOUNTER — TELEPHONE (OUTPATIENT)
Dept: PRIMARY CARE | Facility: CLINIC | Age: 70
End: 2025-08-19
Payer: MEDICARE

## 2025-09-16 ENCOUNTER — APPOINTMENT (OUTPATIENT)
Dept: PRIMARY CARE | Facility: CLINIC | Age: 70
End: 2025-09-16
Payer: MEDICARE